# Patient Record
Sex: FEMALE | Race: WHITE | NOT HISPANIC OR LATINO | Employment: OTHER | URBAN - METROPOLITAN AREA
[De-identification: names, ages, dates, MRNs, and addresses within clinical notes are randomized per-mention and may not be internally consistent; named-entity substitution may affect disease eponyms.]

---

## 2023-04-06 LAB — SARS-COV-2 RESULT: NOT DETECTED

## 2023-04-07 ENCOUNTER — HOSPITAL ENCOUNTER (OUTPATIENT)
Dept: DATA CONVERSION | Facility: HOSPITAL | Age: 68
End: 2023-04-08
Attending: ORTHOPAEDIC SURGERY | Admitting: ORTHOPAEDIC SURGERY

## 2023-04-07 DIAGNOSIS — I10 ESSENTIAL (PRIMARY) HYPERTENSION: ICD-10-CM

## 2023-04-07 DIAGNOSIS — M17.11 UNILATERAL PRIMARY OSTEOARTHRITIS, RIGHT KNEE: ICD-10-CM

## 2023-04-07 DIAGNOSIS — G47.33 OBSTRUCTIVE SLEEP APNEA (ADULT) (PEDIATRIC): ICD-10-CM

## 2023-04-07 DIAGNOSIS — E11.9 TYPE 2 DIABETES MELLITUS WITHOUT COMPLICATIONS (MULTI): ICD-10-CM

## 2023-04-07 LAB
POCT GLUCOSE: 130 MG/DL (ref 74–99)
POCT GLUCOSE: 188 MG/DL (ref 74–99)
POCT GLUCOSE: 276 MG/DL (ref 74–99)

## 2023-04-08 LAB
ANION GAP IN SER/PLAS: 13 MMOL/L (ref 10–20)
ANION GAP SERPL CALCULATED.3IONS-SCNC: 13 MMOL/L (ref 10–20)
BASOPHILS # BLD: 0.01 X10E9/L (ref 0–0.1)
BASOPHILS (10*3/UL) IN BLOOD BY AUTOMATED COUNT: 0.01 X10E9/L (ref 0–0.1)
BASOPHILS RELATIVE PERCENT: 0.1 % (ref 0–2)
BASOPHILS/100 LEUKOCYTES IN BLOOD BY AUTOMATED COUNT: 0.1 % (ref 0–2)
BICARBONATE: 24 MMOL/L (ref 21–32)
CALCIUM (MG/DL) IN SER/PLAS: 8.3 MG/DL (ref 8.6–10.3)
CALCIUM SERPL-MCNC: 8.3 MG/DL (ref 8.6–10.3)
CARBON DIOXIDE, TOTAL (MMOL/L) IN SER/PLAS: 24 MMOL/L (ref 21–32)
CHLORIDE (MMOL/L) IN SER/PLAS: 105 MMOL/L (ref 98–107)
CHLORIDE BLD-SCNC: 105 MMOL/L (ref 98–107)
CREAT SERPL-MCNC: 0.71 MG/DL (ref 0.5–1)
CREATININE (MG/DL) IN SER/PLAS: 0.71 MG/DL (ref 0.5–1.05)
EGFR FEMALE: >90 ML/MIN/1.73M2
EOSINOPHIL # BLD: 0.01 X10E9/L (ref 0–0.7)
EOSINOPHILS (10*3/UL) IN BLOOD BY AUTOMATED COUNT: 0.01 X10E9/L (ref 0–0.7)
EOSINOPHILS RELATIVE PERCENT: 0.1 % (ref 0–6)
EOSINOPHILS/100 LEUKOCYTES IN BLOOD BY AUTOMATED COUNT: 0.1 % (ref 0–6)
ERYTHROCYTE DISTRIBUTION WIDTH (RATIO) BY AUTOMATED COUNT: 13.9 % (ref 11.5–14.5)
ERYTHROCYTE MEAN CORPUSCULAR HEMOGLOBIN CONCENTRATION (G/DL) BY AUTOMATED: 31.9 G/DL (ref 32–36)
ERYTHROCYTE MEAN CORPUSCULAR VOLUME (FL) BY AUTOMATED COUNT: 85 FL (ref 80–100)
ERYTHROCYTE [DISTWIDTH] IN BLOOD BY AUTOMATED COUNT: 13.9 % (ref 11.5–14)
ERYTHROCYTES (10*6/UL) IN BLOOD BY AUTOMATED COUNT: 3.8 X10E12/L (ref 4–5.2)
ESTIMATED AVERAGE GLUCOSE FOR HBA1C: 146 MG/DL
GFR FEMALE: >90 ML/MIN/1.73M2
GLUCOSE (MG/DL) IN SER/PLAS: 167 MG/DL (ref 74–99)
GLUCOSE: 167 MG/DL (ref 74–99)
HCT VFR BLD CALC: 32.3 % (ref 36–46)
HEMATOCRIT (%) IN BLOOD BY AUTOMATED COUNT: 32.3 % (ref 36–46)
HEMOGLOBIN (G/DL) IN BLOOD: 10.3 G/DL (ref 12–16)
HEMOGLOBIN A1C/HEMOGLOBIN TOTAL IN BLOOD: 6.7 %
HEMOGLOBIN: 10.3 G/DL (ref 12–16)
IMMATURE GRANULOCYTES %: 0.5 % (ref 0–0.9)
IMMATURE GRANULOCYTES/100 LEUKOCYTES IN BLOOD BY AUTOMATED COUNT: 0.5 % (ref 0–0.9)
LEUKOCYTES (10*3/UL) IN BLOOD BY AUTOMATED COUNT: 13.1 X10E9/L (ref 4.4–11.3)
LYMPHOCYTES # BLD: 11.7 % (ref 13–44)
LYMPHOCYTES (10*3/UL) IN BLOOD BY AUTOMATED COUNT: 1.54 X10E9/L (ref 1.2–4.8)
LYMPHOCYTES RELATIVE PERCENT: 1.54 X10E9/L (ref 1.2–4.8)
LYMPHOCYTES/100 LEUKOCYTES IN BLOOD BY AUTOMATED COUNT: 11.7 % (ref 13–44)
MCHC RBC AUTO-ENTMCNC: 31.9 G/DL (ref 32–36)
MCV RBC AUTO: 85 FL (ref 80–100)
MONOCYTES # BLD: 0.9 X10E9/L (ref 0.1–1)
MONOCYTES (10*3/UL) IN BLOOD BY AUTOMATED COUNT: 0.9 X10E9/L (ref 0.1–1)
MONOCYTES RELATIVE PERCENT: 6.9 % (ref 2–10)
MONOCYTES/100 LEUKOCYTES IN BLOOD BY AUTOMATED COUNT: 6.9 % (ref 2–10)
NEUTROPHILS (10*3/UL) IN BLOOD BY AUTOMATED COUNT: 10.6 X10E9/L (ref 1.2–7.7)
NEUTROPHILS RELATIVE PERCENT: 80.7 % (ref 40–80)
NEUTROPHILS/100 LEUKOCYTES IN BLOOD BY AUTOMATED COUNT: 80.7 % (ref 40–80)
NEUTROPHILS: 10.6 X10E9/L (ref 1.2–7.7)
PLATELET # BLD: 197 X10E9/L (ref 150–450)
PLATELETS (10*3/UL) IN BLOOD AUTOMATED COUNT: 197 X10E9/L (ref 150–450)
POCT GLUCOSE: 122 MG/DL (ref 74–99)
POTASSIUM (MMOL/L) IN SER/PLAS: 3.9 MMOL/L (ref 3.5–5.3)
POTASSIUM SERPL-SCNC: 3.9 MMOL/L (ref 3.5–5.3)
RBC # BLD: 3.8 X10E12/L (ref 4–5.2)
SODIUM (MMOL/L) IN SER/PLAS: 138 MMOL/L (ref 136–145)
SODIUM BLD-SCNC: 138 MMOL/L (ref 136–145)
UREA NITROGEN (MG/DL) IN SER/PLAS: 22 MG/DL (ref 6–23)
UREA NITROGEN: 22 MG/DL (ref 6–23)
WBC: 13.1 X10E9/L (ref 4.4–11.3)

## 2023-04-09 LAB
ANION GAP IN SER/PLAS: NORMAL
BASOPHILS (10*3/UL) IN BLOOD BY AUTOMATED COUNT: NORMAL
BASOPHILS/100 LEUKOCYTES IN BLOOD BY AUTOMATED COUNT: NORMAL
CALCIUM (MG/DL) IN SER/PLAS: NORMAL
CARBON DIOXIDE, TOTAL (MMOL/L) IN SER/PLAS: NORMAL
CHLORIDE (MMOL/L) IN SER/PLAS: NORMAL
CREATININE (MG/DL) IN SER/PLAS: NORMAL
EOSINOPHILS (10*3/UL) IN BLOOD BY AUTOMATED COUNT: NORMAL
EOSINOPHILS/100 LEUKOCYTES IN BLOOD BY AUTOMATED COUNT: NORMAL
ERYTHROCYTE DISTRIBUTION WIDTH (RATIO) BY AUTOMATED COUNT: NORMAL
ERYTHROCYTE MEAN CORPUSCULAR HEMOGLOBIN CONCENTRATION (G/DL) BY AUTOMATED: NORMAL
ERYTHROCYTE MEAN CORPUSCULAR VOLUME (FL) BY AUTOMATED COUNT: NORMAL
ERYTHROCYTES (10*6/UL) IN BLOOD BY AUTOMATED COUNT: NORMAL
GFR FEMALE: NORMAL
GFR MALE: NORMAL
GLUCOSE (MG/DL) IN SER/PLAS: NORMAL
HEMATOCRIT (%) IN BLOOD BY AUTOMATED COUNT: NORMAL
HEMOGLOBIN (G/DL) IN BLOOD: NORMAL
IMMATURE GRANULOCYTES/100 LEUKOCYTES IN BLOOD BY AUTOMATED COUNT: NORMAL
LEUKOCYTES (10*3/UL) IN BLOOD BY AUTOMATED COUNT: NORMAL
LYMPHOCYTES (10*3/UL) IN BLOOD BY AUTOMATED COUNT: NORMAL
LYMPHOCYTES/100 LEUKOCYTES IN BLOOD BY AUTOMATED COUNT: NORMAL
MANUAL DIFFERENTIAL Y/N: NORMAL
MONOCYTES (10*3/UL) IN BLOOD BY AUTOMATED COUNT: NORMAL
MONOCYTES/100 LEUKOCYTES IN BLOOD BY AUTOMATED COUNT: NORMAL
NEUTROPHILS (10*3/UL) IN BLOOD BY AUTOMATED COUNT: NORMAL
NEUTROPHILS/100 LEUKOCYTES IN BLOOD BY AUTOMATED COUNT: NORMAL
NRBC (PER 100 WBCS) BY AUTOMATED COUNT: NORMAL
PLATELETS (10*3/UL) IN BLOOD AUTOMATED COUNT: NORMAL
POTASSIUM (MMOL/L) IN SER/PLAS: NORMAL
SODIUM (MMOL/L) IN SER/PLAS: NORMAL
UREA NITROGEN (MG/DL) IN SER/PLAS: NORMAL

## 2023-09-06 VITALS — HEIGHT: 55 IN | BODY MASS INDEX: 36.99 KG/M2 | WEIGHT: 159.83 LBS

## 2023-09-14 NOTE — DISCHARGE SUMMARY
Send Summary:   Discharge Summary Providers:  Provider Role Provider Name   · Referring Shanda Tinoco   · Attending Mukesh Beach   · Consulting Devi Lawrence   · Consulting Enrike Sanders   · Primary Shanda Tinoco       Note Recipients: Shanda Tinoco MD - [id number]  []  Mukesh Beach MD - [id number] [PREFERRED]       Discharge:    Summary:   Admission Date: .07-Apr-2023 08:30:00   Discharge Date: 08-Apr-2023   Attending Physician at Discharge: Mukesh Beach   Admission Reason: Right Knee Arthoplasty (1)   Final Discharge Diagnoses: Status post total knee  replacement   Procedures: Date: 07-Apr-2023 13:02:00  Procedure Name: 1. RIGHT TOTAL KNEE ARTHROPLASTY   Condition at Discharge: Satisfactory   Disposition at Discharge: .Home   Vital Signs:        T   P  R  BP   MAP  SpO2   Value  36.9  74  16  132/60   87  94%  Date/Time 4/8 7:42 4/8 7:42 4/8 7:42 4/8 7:42  4/8 7:42 4/8 7:42  Range  (35.8C - 36.9C )  (74 - 86 )  (16 - 18 )  (110 - 134 )/ (53 - 64 )  (87 - 89 )  (94% - 97% )  Highest temp of 36.9 C was recorded at 4/8 7:42    Date:            Weight/Scale Type:  Height:   07-Apr-2023 21:12  72.5  kg / bed  59.9  cm  Hospital Course:                                                  Discharge summary  This patient  was admitted to the hospital on 4/7/23 after undergoing TKA without complications that morning.    During the postoperative period, while in hospital, patient was medically managed by the hospitalist. Please see medial notes and H&P for patients additional diagnoses.  Ortho agrees with all medical diagnoses and treatments while patient in hospital.  No significant or unexpected findings or abnormal ortho imaging were noted during the hospital stay  Hospital course  Patient tolerated surgical procedure well and there was no complications. Patient progressed adequately through their recovery during hospital stay including PT and rehabilitation.  DVT prophylaxis was implemented POD#1  Patient was  then D/C to home with [facility] in stable condition.    Patient was instructed on the use of pain medications, the signs and symptoms of infection, and was given our number to call should they have any questions or concerns following discharge.          Discharge Information:    and Continuing Care:   Lab Results - Pending:    None  Radiology Results - Pending: None   Discharge Instructions:    Activity:           activity as tolerated.          May shower..            May not return to school/work.            May not drive.            Weight-bearing Instructions: full weight bearing.  per total knee    Nutrition/Diet:           resume normal diet    Wound Care:           Wound Site:   right knee          Change Dressin time   peel off rubber dressing on 23    Additional Orders:           Additional Instructions:                                                        Total Knee Replacement                                                                                                    Discharge Instructions    To prevent Clot formation, you have been placed on the following medication:    Aspirin  for 30 days started on                                               Surgical Site Care:    Change dressing once a day and PRN (as needed).     Apply 4 x 4 sponge and light tape.     If glue present, leave open to air.    You may leave wound open to air after initial dressing removal, if wound is clean, dry and intact     Aquacel Ag dressing is present, do not remove dressing for 7 days, unless heavily saturated.    If heavily saturated, remove dressing and start using instructions above     Staples will be removed on post-operative day 14 and steri-strips applied Showering is permitted starting post op day 1 if waterproof Aquacel dressing is present or when the incision is covered with 4 x 4 and Tegaderm waterproof dressing     Until all areas of incision are healed.                                                  Physical Therapy:        Weight Bearing Status:  Weight bear as tolerated     total knee- protocol                                                                                                                            Pain Medications        You were given narcotics     Wean off pain medications as you deem appropriate as long as pain is under control Cold packs/Ice packs/Machine May be used every hour for 15-30 minutes as tolerated Be sure to have a barrier (cloth, clothing, towel) between the site an the ice pack to  prevent frostbite.    incentive spirometer 10 x every hour while awake for 2 weeks     surgical teds x3 weeks- removing only for skin care and hygiene     IF INCISION STARTS TO DRAIN CALL OFFICE RIGHT A WAY    [facility] office if    Increased redness, swelling, drainage of any kind, and/or pain to surgery site. As well as new onset fevers and or chills. These could signify an infection. Calf or thigh tenderness to touch as well as increased swelling or redness. This could signify  a clot formation. Numbness or tingling to an area around the incision site or below the incision site (toes).Any rash appears, increased or new onset nausea/vomiting occur. This may indicate a reaction to a medication.    Phone # [phone].                                    Follow up with Surgeon in 2 weeks or as scheduled by the office     Home Care Certification:           Home Care Agency:    [facility] [phone]          Skilled Disciplines Ordered:   PT,  OT    Home Care Services:           Home Care Skilled Service:   Rehab (PT/OT/SP eval and treat),  wound care    Follow Up Appointments:    Follow-Up Appointment 01:           Physician/Dept/Service:   Dr Domínguez          Reason for Referral:   right knee- post op          Call to Schedule in:   2 weeks,  or as already scheduled by the office          Location:   [address] [facility]          Phone Number:    "[phone]    Discharge Medications: Home Medication   hydroCHLOROthiazide 25 mg oral tablet - 1 tab(s) orally once a day  magnesium oxide 250 mg oral tablet - 1 tab(s) orally once a day  PreserVision AREDS 2 oral capsule - 1 cap(s) orally 2 times a day  Vitamin D3 50 mcg (2000 intl units) oral tablet - 2 tab(s) orally once a day  acetaminophen 500 mg oral capsule - 2 cap(s) orally every 6 hours  aspirin 81 mg oral delayed release tablet - 1 tab(s) orally 2 times a day  oxyCODONE 5 mg oral tablet - 1 tab(s) orally every 4 hours, As needed, Pain - 7-10    and take 0.5 tabs (2.5mg) for pain 4-6  docusate sodium 100 mg oral capsule - 1 cap(s) orally 2 times a day     PRN Medication   cyclobenzaprine 5 mg oral tablet - 1 tab(s) orally 3 times a day, As needed, Muscle Spasms  ibuprofen 200 mg oral tablet - 2 tab(s) orally every 6 hours, As Needed  Sudafed - null     DNR Status:   ·  Code Status Code Status order at time of discharge: Full Code       Electronic Signatures:  Pili Farah (APRN-CNP)  (Signed 08-Apr-2023 09:51)   Authored: Send Summary, Summary Content, Ongoing Care,  DNR Status, Note Completion      Last Updated: 08-Apr-2023 09:51 by Pili Farha (APRN-CNP)    References:  1.  Data Referenced From \"Consult-General Internal Medicine\" 07-Apr-2023 15:50   "

## 2023-09-14 NOTE — H&P
History & Physical Reviewed:   I have reviewed the History and Physical dated:  02-Apr-2023   History and Physical reviewed and relevant findings noted. Patient examined to review pertinent physical  findings.: No significant changes   Home Medications Reviewed: no changes noted   Allergies Reviewed: no changes noted     Consent:   COVID-19 Consent:  ·  COVID-19 Risk Consent Surgeon has reviewed key risks related to the risk of chandan COVID-19 and if they contract COVID-19 what the risks are.       Electronic Signatures:  Mukesh Beach)  (Signed 07-Apr-2023 10:37)   Authored: History & Physical Reviewed, Consent, Note  Completion      Last Updated: 07-Apr-2023 10:37 by Mukesh Beach)

## 2023-09-14 NOTE — PROGRESS NOTES
Service: Orthopaedics     Subjective Data:   BENEDICT DELACRUZ is a 68 year old Female who is Hospital Day # 2 and POD #1 for 1. RIGHT TOTAL KNEE ARTHROPLASTY;    Overnight Events: Patient had an uneventful night.     Objective Data:     Objective Information:      T   P  R  BP   MAP  SpO2   Value  36.9  74  18  132/60   87  94%  Date/Time 4/8 7:42 4/8 7:42 4/8 0:53 4/8 7:42  4/8 7:42 4/8 7:42  Range  (35.8C - 36.9C )  (74 - 86 )  (18 - 18 )  (110 - 134 )/ (53 - 64 )  (87 - 89 )  (94% - 97% )  Highest temp of 36.9 C was recorded at 4/8 7:42      Pain reported at 4/8 8:44: 4 = Moderate      T   P  R  BP   MAP  SpO2   Value  36.9  74  16  132/60   87  94%  Date/Time 4/8 7:42 4/8 7:42 4/8 7:42 4/8 7:42  4/8 7:42 4/8 7:42  Range  (35.8C - 36.9C )  (74 - 86 )  (16 - 18 )  (110 - 134 )/ (53 - 64 )  (87 - 89 )  (94% - 97% )  Highest temp of 36.9 C was recorded at 4/8 7:42        Pain reported at 4/8 8:44: 4 = Moderate         Weights   4/7 21:12: Weight in kg (Weight (kg))  72.5  4/7 21:12: Weight in lbs ((lbs))  159.8  4/7 21:12: BMI (kg/m2) (BMI (kg/m2))  202.061    Medication:    Medications:          Continuous Medications       --------------------------------    1. Lactated Ringers Infusion:  1000  mL  IntraVenous  <Continuous>         Scheduled Medications       --------------------------------    1. Acetaminophen:  650  mg  Oral  Every 6 Hours    2. Aspirin Enteric Coated:  81  mg  Oral  2 Times a Day    3. Docusate:  100  mg  Oral  2 Times a Day    4. Insulin Lispro Mild Corrective Scale:  unit(s)  SubCutaneous  4 Times a Day Insulin Timing    5. Pantoprazole:  40  mg  Oral  Daily    6. Polyethylene Glycol:  17  gram(s)  Oral  2 Times a Day         PRN Medications       --------------------------------    1. Cyclobenzaprine:  5  mg  Oral  3 Times a Day    2. Dextrose 50% in Water Injectable:  25  gram(s)  IntraVenous Push  Every 15 Minutes    3. diphenhydrAMINE Injectable:  12.5  mg  IntraVenous Push  Every 6  Hours    4. Glucagon Injectable:  1  mg  IntraMuscular  Every 15 Minutes    5. Magnesium Hydroxide -Al Hydrox -Simethicone Oral Liquid:  30  mL  Oral  Every 6 Hours    6. Ondansetron Injectable:  4  mg  IntraVenous Push  Every 6 Hours    7. oxyCODONE Immediate Release:  2.5  mg  Oral  Every 4 Hours    8. oxyCODONE Immediate Release:  5  mg  Oral  Every 4 Hours    9. oxyCODONE Immediate Release:  7.5  mg  Oral  Every 4 Hours        Recent Lab Results:    Results:    CBC: 4/8/2023 03:40              \     Hgb     /                              \     10.3 L    /  WBC  ----------------  Plt               13.1 H    ----------------    197              /     Hct     \                              /     32.3 L    \            RBC: 3.80 L    MCV: 85     Neutrophil %: 80.7      BMP: 4/8/2023 03:40  NA+        Cl-     BUN  /                         138    105    22  /  --------------------------------  Glucose                ---------------------------  167 H    K+     HCO3-   Creat \                         3.9  24    0.71  \  Calcium : 8.3 L    Anion Gap : 13        I have reviewed these laboratory results: Complete Blood Count + Differential [Drawn 08-Apr-2023 03:40:00], Basic Metabolic Panel [Drawn 08-Apr-2023 03:40:00].    Assessment and Plan:        Admitting Dx:   Status post total knee replacement: Entered Date: 07-Apr-2023  09:45    Comorbidities:  ·  Comorbidity obesity   ·  Obesity morbid obesity (BMI 40+)   ·  .06     Code Status:  ·  Code Status Full Code     Advance Care Planning:  Advance Care Planning: I evaluated the patient  and determined the patient's capacity to understand the risks, benefits and alternatives to treatment. I elicited the patient's goals for treatment and reviewed advance directives and medical orders for life sustaining treatment. The patient was given  an opportunity to review a blank advance directive as appropriate.     Assessment:    Assessment    pt is doing really  good.  able to move around, has been able to ambulate and use the restroom, pain is well controlled, denies any nausea vomiting, SOB or chest pain. doing. pt's incision is clean dry and intact and area is soft. pt is doing  good and  states he is ready to get going       PLAN   PT/ OT and continued mobility   Home with [facility]   has a walker   DVT prophylaxis    pain meds      Electronic Signatures:  Mukesh Beach)   (Signed 08-Apr-2023 20:23)   Co-Signer: Service, Assessment/Plan Review, Subjective Data, Objective Data, Assessment and Plan, Note Completion  Pili Farah (APRN-CNP)   (Signed 08-Apr-2023 09:43)   Authored: Service, Assessment/Plan Review, Subjective Data, Objective Data, Assessment and Plan, Note Completion    Last Updated: 08-Apr-2023 20:23 by Mukesh Beach)

## 2023-10-02 NOTE — OP NOTE
PROCEDURE DETAILS    Preoperative Diagnosis:  Osteoarthritis of right knee, M17.11    Postoperative Diagnosis:  Osteoarthritis of right knee, M17.11    Surgeon: Mukesh Beach  Resident/Fellow/Other Assistant: Ramana Croft    Procedure:  1. RIGHT TOTAL KNEE ARTHROPLASTY    Anesthesia: Spinal + regional   Estimated Blood Loss: 50 ml   Findings: see op report         Operative Report:   Implants: Kyle persona vitamin E knee size 7 CR femur, size D tibia, 10 mm ultracongruent polyethylene, 29 mm patella  Operative findings:  Severe degenerative joint disease    Operative Indications:  Failed multiple non-surgical modalities and great difficulty performing activities of daily living as indicated in pre-operative notes.     Operative Procedure:  Patient was met prior to surgery in pre-operative holding.  The appropriate extremity was marked and recent health status was reviewed and we found no contraindication to proceeding with the scheduled procedure.  We again reviewed the risks of infection,  wound issues, deep venous thrombosis, pulmonary embolism, medical complications including cardiac and pulmonary, neurologic and vascular injury and incomplete relief of pre-operative pain.    The patient then underwent regional anesthesia in pre-op holding.  The patient was transported to the operating room.  A thigh tourniquet was placed and a small bump on the buttock.  The patient was resting comfortably in a supine position with all viktoriya  prominences well padded.  Sequential compression device was placed on the contralateral lower extremity.  An exam under anesthesia was performed to evaluate the patient´s range of motion and ligamentous integrity.    The patient was prepped and draped in the usual sterile fashion.  The leg was placed in a leg da silva after appropriate padding.  After prep, drape, antibiotic and time out, the leg was exsanguinated and the tourniquet inflated.  A longitudinal incision  was made over  the anterior knee.  The dissection was carried out down through the skin and subcutaneous tissue.  A medial parapatellar arthrotomy was performed.  An effusion and synovitis was noted compatible with the grade IV changes of the articular  cartilage.  The fat pad was de-bulked, Creston´s line was marked and some of the deep medial collateral ligament was released from the tibia.  The ACL was resected. The knee was flexed up and medial and lateral retractors were placed to protect the  collateral ligaments and popliteus tendon.      Due to the degree of deformity and stiffness, the posterior cruciate ligament was resected.    A canal finder reamer was utilized to gain entry into the femur.  An intramedullary patricia was placed by hand and set to 5 degrees of valgus for the distal femoral cut.  The cutting block was placed and the distal femoral cut was performed.   A sizing guide  was then placed and the femoral size was measured based on posterior referencing.  The 4 in 1 cutting block was placed set to 3 degrees of external rotation.  The rotation was confirmed based on the transepicondylar axis and Creston´s line.  There  was no significant hypoplasia of the posterior condyles.     Once the cutting block was placed the cuts were performed: anterior, posterior and chamfer cuts.  There collaterals were protected and the bone was removed.  There was no notching of the femur.   Once the femoral cuts were complete, we turned our attention  to the tibia.  Retractors were placed medial, lateral and posteriorly.  An extramedullary alignment patricia was used and the slope was set in accordance with the implant.  We measured 2 mm of resection from the lowest point on the tibia.  The tibial cut was  completed with care not to encroach posterior to the knee joint.    After the tibial cut was completed, we removed the remaining menisci.  Using a curved osteotome posterior osteophytes were carefully removed from the femur.  We then  assessed the flexion and extension gaps using trial spacer blocks.    The gaps appeared symmetric with a 10 mm spacer and we proceeded to placing trial implants.  The final poly was a 10 UC, there was some slight overhang on the femur as there is no narrow option in the nickel free knee..    With symmetric gaps we progressed to placing trial implants.    A trial tibial component was placed with care to avoid overhang.  The rotation was set in line with the medial third of the tibial tubercle.  A trial femoral component was then placed, followed by a trial articular surface.  The knee was taken through  range of motion with the provisional components.  The flexion and extension gaps were evaluated, as well as the patellar tracking and mid-flexion stability.  The following soft tissue balancing, recuts, and/or modifications were required: none.      The patella was everted and ~9 mm of bone were removed from the thickest portion using a clamp/cutting guide.  The lug holes were drilled in the patella and femur.  The tibia was prepared with the drill and broach after confirmation of alignment using  a drop patricia.   The sclerotic areas of the bone were drilled using a small drill bit.  The capsule around the knee was injected using a long acting local anesthetic.    The cement ([employer] simplex) was mixed in a vacuum sealed fashion while the bone was pulsatile lavaged.   The bone was dried and the implants were placed with a gentle posterior directed force and a clamp on the patella.  The excess cement was removed.    After the cement dried the gap balancing was reassessed prior to placing the final articular surface.  The proud cement mantle was removed using a small osteotome.  The knee was then copiously lavaged with sterile saline and Irrisept (0.05% chlorhexidine  gluconate).  The tourniquet was taken down and hemostasis was obtained using Bovie electrocautery and tranexamic acid (per protocol).  No significant bleeders  were encountered and the usage of a drain was not felt to be necessary.    A layered closure was performed using 1 Vicryl and 1 Stratafix in the retinacular layer and quadriceps tendon.  2-0 vicryl in the deep dermal layer and monofilament in the skin.  An adherent, water proof dressing was placed followed by Webril and an ace  bandage. All counts were reported as correct.  The patient was stable to the post anesthesia care unit.    I was present and participated in the entire procedure. The Physician Assistant participated in all critical elements of the procedure under my direct supervision. The surgical incision was closed by the PA under my indirect supervision. There were no  qualified residents available to assist.    Complications:  none  Estimated blood loss:  50 ml  Specimens:  None      The physician assistant was present for the entire case.  Given the nature of the procedure and disease process a skilled surgical assistant was  necessary for the case.  The assistant was necessary for retraction and helped directly facilitate completion of the surgery.  A certified scrub tech was at the back table managing instruments and supplies for the surgical procedure.  Note Recipients:   Shanda Tinoco MD - [phone] []  Mukesh Beach MD - [phone] [PREFERRED]                        Attestation:   Note Completion:  Attending Attestation I performed the procedure without a resident         Electronic Signatures:  Mukesh Beach)  (Signed 07-Apr-2023 13:04)   Authored: Post-Operative Note, Chart Review, Note Completion      Last Updated: 07-Apr-2023 13:04 by Mukesh Beach)

## 2023-11-06 ENCOUNTER — TELEPHONE (OUTPATIENT)
Dept: ORTHOPEDIC SURGERY | Facility: CLINIC | Age: 68
End: 2023-11-06

## 2023-11-06 NOTE — TELEPHONE ENCOUNTER
Patient was called and advised the Amoxicillin 800mg she is currently on will cover her dental appointment.  She will take this one hour before the appointment.

## 2023-11-06 NOTE — TELEPHONE ENCOUNTER
BM pt LVM had a TKA several months ago and has dental work coming up and will need an antibiotic sent in but she is also on an anitbiotic for a sinus infection so she wanted to discuss that to see if that would cover the dental too or not. Please call her at [phone]

## 2024-03-06 NOTE — CONSULTS
Service:   Service: General Internal Medicine     Consult:  Consult requested by (Attending Name): Mukesh Beach   Reason: Hospitalist/Teaching Service or PCP for Medical  Management     History of Present Illness:   Admission Reason: Right Knee Arthoplasty   HPI:    BENEDICT DELACRUZ is a 68 year old Female presents to the Orthopedics team with increased pain and decreased ability to perform ADLS due to right knee OA. After failed  conservative treatment, patient underwent surgery with no immediate post op complications, reports minimal pain to site described as dull in nature, mild in severity, responding well to pain meds. No other complaints. Hospitalist services were consulted  for management of the patient's medical conditions post/op.  Patient examined and seen, resting comfortably. Denies chest pain, shortness of breath, abdominal pain, dizziness, fever or chills. Currently patient vital signs are stable. Plan of care was  discussed with patient, verbalized understanding through teach back method. Hospitalist team will continue to follow until discharge.    Hospitalist team consulted for post op management of HTN and DM type 2.    Past medical history: DM 2 (meds and diet only), HTN, hysterectomy, breast reduction, OA     Social history: denies drug, alcohol or tobacco use     Family history: HTN and cancer     Review of systems: 10 system were reviewed and were negative except what was mentioned in history of present illness             Allergies:  ·  Augmentin : Arrhythmia  ·  Metals/Minerals : Unknown       Intolerances:  ·  erythromycin : GI Upset  ·  meloxicam : GI Upset  ·  cephalexin : GI Upset    Objective:     Objective Information:        T   P  R  BP   MAP  SpO2   Value  36.4  76     134/62   89  97%  Date/Time 4/7 14:27 4/7 14:27   4/7 14:27  4/7 14:27 4/7 14:27  Range  (36.4C - 36.4C )  (76 - 76 )    (134 - 134 )/ (62 - 62 )  (89 - 89 )  (97% - 97% )    Physical Exam Narrative:  ·  Physical Exam:     Constitutional: Well developed, awake/alert/oriented x3, cooperative  Eyes: PERRL,  clear sclera  ENMT: mucous membranes moist, no apparent injury, no lesions seen  Head/Neck: Neck supple, no apparent injury,  Respiratory/Thorax: Patent airways,  normal breath sounds   Cardiovascular: Regular, rate and rhythm, no murmurs, 2+ equal pulses of the extremities, normal S 1and S 2  Gastrointestinal: Nondistended, soft, non-tender,   Musculoskeletal: mild decrease range of motion to right knee s/p sx intervention, good capillary refills bilaterally, +2 pulses, good sensation   Extremities: normal extremities,  incision covered with immobilizer and splint    Skin: warm, dry, intact  Neurological: alert/oriented x 3, speech clear  Psychiatric: appropriate mood and behavior      Medications:    Medications:      ANTI-INFECTIVES:    1. ceFAZolin 2 gram/ D5W 100 mL Premix IVPB:  100  mL  IntraVenous Piggyback  Every 6 Hours      CENTRAL NERVOUS SYSTEM AGENTS:    1. Acetaminophen:  650  mg  Oral  Every 6 Hours    2. Aspirin Enteric Coated:  81  mg  Oral  2 Times a Day    3. Ketorolac Injectable:  7.5  mg  IntraVenous Push  Every 6 Hours    4. oxyCODONE Immediate Release:  2.5  mg  Oral  Every 4 Hours   PRN       5. oxyCODONE Immediate Release:  5  mg  Oral  Every 4 Hours   PRN       6. oxyCODONE Immediate Release:  7.5  mg  Oral  Every 4 Hours   PRN       7. Ondansetron Injectable:  4  mg  IntraVenous Push  Every 6 Hours   PRN       8. Cyclobenzaprine:  5  mg  Oral  3 Times a Day   PRN         GASTROINTESTINAL AGENTS:    1. Magnesium Hydroxide -Al Hydrox -Simethicone Oral Liquid:  30  mL  Oral  Every 6 Hours   PRN       2. Docusate:  100  mg  Oral  2 Times a Day    3. Polyethylene Glycol:  17  gram(s)  Oral  2 Times a Day    4. Pantoprazole:  40  mg  Oral  Daily      NUTRITIONAL PRODUCTS:    1. Lactated Ringers Infusion:  1000  mL  IntraVenous  <Continuous>      RESPIRATORY AGENTS:    1. diphenhydrAMINE Injectable:  12.5  mg   IntraVenous Push  Every 6 Hours   PRN         Recent Lab Results:    Results:        I have reviewed these laboratory results:    Glucose_POCT  Trending View      Result 07-Apr-2023 13:52:00  07-Apr-2023 09:19:00    Glucose-POCT 188   H   130   H        Coronavirus 2019, Screen Asymptomatic  05-Apr-2023 10:19:00      Result Value    Fluid Source  Nasal, Nasopharyngeal    Coronavirus 2019,PCR  NOT DETECTED  Reference Range: Not Detected .This assay is designed to detect the ORF1a/b and E genes of SARS-CoV-2 via nucleic acid amplification. A Not Detected  result does not preclude 2019-nCoV infection since the adequacy of sample colle          Assessment:    Hospitalist team consulted for post op management of HTN and DM type 2.    # Right Knee Arthoplasty  Right Knee Pain  Orthopedic Team Primary   Pain and DVT Prophylaxis per Ortho team  PT/OT treatment evaluation  Fall precautions   Incentive spirometer education and demonstration addressed   Discharge planning  CBC BMP in a.m.  Vital signs every 8    # DM type 2   Diabetes Mellitus   Accu Checks with SSI   Diabetic/Cardiac diet  Healthy Lifestyle encouraged  Will add SSI only while inpatient   Last A1C shows 3/2021  Will Repeat Hemoglobin A1C     # HTN  Hold HCTZ until discharge  no indication for telemetry at this time  Hemodynamically stable  Vital signs Q8    Thank you for consult  We will continue to follow    Plan of care was discussed extensively with patient, RN and Ortho NP. Patient verbalized understanding through teach back method. All questions and concerns addressed upon examination.     ***Of note, this documentation is completed using the Dragon Dictation system (voice recognition software). There may be spelling and/or grammatical errors that were not corrected prior to final submission.***      Plan of Care Reviewed With:  Plan of Care Reviewed With: patient; daughter     Consult Status:  Consult Order ID: [id number]       Electronic  Signatures:  Mary Kate Alvarez ([facility]-CNP)  (Signed 07-Apr-2023 16:05)   Authored: Service, History of Present Illness, Allergies,  Objective, Assessment/Recommendations, Note Completion      Last Updated: 07-Apr-2023 16:05 by Mary Kate Alvarez ([facility]-CNP)

## 2024-04-22 ENCOUNTER — TELEPHONE (OUTPATIENT)
Dept: ORTHOPEDIC SURGERY | Facility: CLINIC | Age: 69
End: 2024-04-22

## 2024-04-22 NOTE — TELEPHONE ENCOUNTER
Pt called is going to be flying next month and had a TKA last year and was told she needs a letter stating she had that done for the metal detectors. Would like letter mailed to her house.

## 2024-10-24 ENCOUNTER — TELEPHONE (OUTPATIENT)
Dept: ORTHOPEDIC SURGERY | Facility: CLINIC | Age: 69
End: 2024-10-24

## 2024-10-24 NOTE — TELEPHONE ENCOUNTER
Patient called and left voicemail stating she had a total knee replacement about 1 1/2 years ago. She wanted to know how long did she need to take antibiotics for her dental appointments.    She can be reached at [phone]

## 2024-12-10 ENCOUNTER — OFFICE VISIT (OUTPATIENT)
Dept: GASTROENTEROLOGY | Facility: CLINIC | Age: 69
End: 2024-12-10
Payer: MEDICARE

## 2024-12-10 VITALS
DIASTOLIC BLOOD PRESSURE: 88 MMHG | TEMPERATURE: 98.1 F | BODY MASS INDEX: 32.59 KG/M2 | WEIGHT: 166 LBS | SYSTOLIC BLOOD PRESSURE: 160 MMHG | HEIGHT: 60 IN | HEART RATE: 81 BPM

## 2024-12-10 DIAGNOSIS — K31.A0 INTESTINAL METAPLASIA OF GASTRIC CARDIA: ICD-10-CM

## 2024-12-10 DIAGNOSIS — R14.0 ABDOMINAL BLOATING: Primary | ICD-10-CM

## 2024-12-10 DIAGNOSIS — K21.9 HIATAL HERNIA WITH GERD: ICD-10-CM

## 2024-12-10 DIAGNOSIS — K44.9 HIATAL HERNIA WITH GERD: ICD-10-CM

## 2024-12-10 PROCEDURE — 1126F AMNT PAIN NOTED NONE PRSNT: CPT | Performed by: INTERNAL MEDICINE

## 2024-12-10 PROCEDURE — 3008F BODY MASS INDEX DOCD: CPT | Performed by: INTERNAL MEDICINE

## 2024-12-10 PROCEDURE — 99202 OFFICE O/P NEW SF 15 MIN: CPT | Performed by: INTERNAL MEDICINE

## 2024-12-10 PROCEDURE — 1160F RVW MEDS BY RX/DR IN RCRD: CPT | Performed by: INTERNAL MEDICINE

## 2024-12-10 PROCEDURE — 1036F TOBACCO NON-USER: CPT | Performed by: INTERNAL MEDICINE

## 2024-12-10 PROCEDURE — 99212 OFFICE O/P EST SF 10 MIN: CPT | Performed by: INTERNAL MEDICINE

## 2024-12-10 PROCEDURE — 1159F MED LIST DOCD IN RCRD: CPT | Performed by: INTERNAL MEDICINE

## 2024-12-10 RX ORDER — PANTOPRAZOLE SODIUM 40 MG/1
40 TABLET, DELAYED RELEASE ORAL
COMMUNITY
Start: 2024-10-03

## 2024-12-10 RX ORDER — SUCRALFATE 1 G/1
TABLET ORAL
COMMUNITY
Start: 2024-10-03 | End: 2024-12-10 | Stop reason: ALTCHOICE

## 2024-12-10 RX ORDER — CHOLECALCIFEROL (VITAMIN D3) 50 MCG
2 TABLET ORAL DAILY
COMMUNITY

## 2024-12-10 RX ORDER — LOSARTAN POTASSIUM 25 MG/1
25 TABLET ORAL
COMMUNITY
Start: 2024-02-19

## 2024-12-10 RX ORDER — HYDROCHLOROTHIAZIDE 25 MG/1
25 TABLET ORAL
COMMUNITY

## 2024-12-10 RX ORDER — FLUCONAZOLE 150 MG/1
TABLET ORAL
COMMUNITY
Start: 2023-12-19

## 2024-12-10 RX ORDER — FAMCICLOVIR 500 MG/1
TABLET ORAL
COMMUNITY
Start: 2023-12-19

## 2024-12-10 RX ORDER — ASCORBIC ACID 125 MG
TABLET,CHEWABLE ORAL
COMMUNITY

## 2024-12-10 RX ORDER — ATENOLOL 25 MG/1
25 TABLET ORAL
COMMUNITY

## 2024-12-10 RX ORDER — FAMOTIDINE 20 MG/1
TABLET, FILM COATED ORAL
COMMUNITY

## 2024-12-10 RX ORDER — TIZANIDINE 4 MG/1
TABLET ORAL
COMMUNITY
Start: 2023-12-20

## 2024-12-10 RX ORDER — IBUPROFEN 400 MG/1
400 TABLET ORAL EVERY 6 HOURS PRN
COMMUNITY

## 2024-12-10 RX ORDER — ACETAMINOPHEN 325 MG/1
TABLET ORAL
COMMUNITY

## 2024-12-10 RX ORDER — CALCITRIOL 0.25 UG/1
0.25 CAPSULE ORAL
COMMUNITY

## 2024-12-10 ASSESSMENT — PAIN SCALES - GENERAL: PAINLEVEL_OUTOF10: 0-NO PAIN

## 2024-12-10 NOTE — PROGRESS NOTES
Subjective   Patient ID: Amaris Lopez is a 69 y.o. female.    Self referred after a recent EGD noted possible BE    Dr. Paul Wyatt noted an irregular Z line and bx noted IM without dysplasia    Last EGD was about 2007 for dyspepsia  PPI used since she had PUD in 1981 - started out with Tagamet and Carafate off an on  Used PPI intermittently  Now using it daily but actually does not feel better - pantoprazole 40 mg on empty stomach plus Pepcid in the evening    NSAIDS used for knee surgery and used more acid suppression then    Never had classic reflux  Some pain in the middle back was the prompt for the last EGD  No CT scan     We discussed Pepcid tachyphylaxis, PPI induced hyper gastrin state     Feels bloated and wakes up pregnant   Diarrhea was up to 4 to 5 x a day over 3 weeks but now BM formed     Cologuard negative and due next year - discussed the value of colonoscopy    She feels her back pain was improved with time and PPI use but continues to have indigestion; simethicone and other antacids not helping    does not help        Review of Systems    Objective   Physical Exam  Constitutional:       Appearance: Normal appearance. She is obese.   Neurological:      Mental Status: She is alert.   Psychiatric:         Mood and Affect: Mood normal.         Behavior: Behavior normal.         Thought Content: Thought content normal.         Judgment: Judgment normal.         Assessment/Plan   Diagnoses and all orders for this visit:  Abdominal bloating  -     Pancreatic Elastase, Fecal; Future  -     CT abdomen pelvis w IV contrast; Future  -     Creatinine, Serum; Future  Hiatal hernia with GERD  -     CT abdomen pelvis w IV contrast; Future  -     Creatinine, Serum; Future  Intestinal metaplasia of gastric cardia  -     CT abdomen pelvis w IV contrast; Future  -     Creatinine, Serum; Future    You should consider reducing your acid suppression medications for the reasons we discussed.  I would like to screen  you for EPI.    Nura Perez, DO

## 2024-12-16 ENCOUNTER — LAB (OUTPATIENT)
Dept: LAB | Facility: LAB | Age: 69
End: 2024-12-16
Payer: MEDICARE

## 2024-12-16 DIAGNOSIS — R14.0 ABDOMINAL BLOATING: ICD-10-CM

## 2024-12-16 PROCEDURE — 82653 EL-1 FECAL QUANTITATIVE: CPT

## 2024-12-19 ENCOUNTER — HOSPITAL ENCOUNTER (OUTPATIENT)
Dept: RADIOLOGY | Facility: CLINIC | Age: 69
Discharge: HOME | End: 2024-12-19
Payer: MEDICARE

## 2024-12-19 DIAGNOSIS — R14.0 ABDOMINAL BLOATING: ICD-10-CM

## 2024-12-19 DIAGNOSIS — K21.9 HIATAL HERNIA WITH GERD: ICD-10-CM

## 2024-12-19 DIAGNOSIS — K44.9 HIATAL HERNIA WITH GERD: ICD-10-CM

## 2024-12-19 DIAGNOSIS — K31.A0 INTESTINAL METAPLASIA OF GASTRIC CARDIA: ICD-10-CM

## 2024-12-19 LAB — ELASTASE PANC STL-MCNT: >800 UG/G

## 2024-12-19 PROCEDURE — 2550000001 HC RX 255 CONTRASTS: Performed by: INTERNAL MEDICINE

## 2024-12-19 PROCEDURE — 74177 CT ABD & PELVIS W/CONTRAST: CPT | Performed by: RADIOLOGY

## 2024-12-19 PROCEDURE — 74177 CT ABD & PELVIS W/CONTRAST: CPT

## 2024-12-27 ENCOUNTER — TELEPHONE (OUTPATIENT)
Dept: GASTROENTEROLOGY | Facility: CLINIC | Age: 69
End: 2024-12-27
Payer: MEDICARE

## 2024-12-27 NOTE — TELEPHONE ENCOUNTER
Call returned.  Questions answered.    CT showed fatty liver.    Feeling good; may be better off acid suppression.  Eating less now.  Goal weight will be difficult to achieve.  I recommend a trial of GLRA.      Nura Perez, DO

## 2024-12-27 NOTE — TELEPHONE ENCOUNTER
----- Message from Janet BURRELL sent at 12/27/2024  9:53 AM EST -----  Regarding: RESULTS  Patient is calling for test results  [phone]